# Patient Record
Sex: FEMALE | Race: WHITE | HISPANIC OR LATINO | Employment: UNEMPLOYED | URBAN - METROPOLITAN AREA
[De-identification: names, ages, dates, MRNs, and addresses within clinical notes are randomized per-mention and may not be internally consistent; named-entity substitution may affect disease eponyms.]

---

## 2023-06-14 ENCOUNTER — HOSPITAL ENCOUNTER (EMERGENCY)
Facility: HOSPITAL | Age: 48
Discharge: HOME | End: 2023-06-14
Attending: EMERGENCY MEDICINE

## 2023-06-14 VITALS
HEIGHT: 61 IN | SYSTOLIC BLOOD PRESSURE: 181 MMHG | HEART RATE: 82 BPM | TEMPERATURE: 99 F | BODY MASS INDEX: 29.27 KG/M2 | DIASTOLIC BLOOD PRESSURE: 113 MMHG | RESPIRATION RATE: 18 BRPM | OXYGEN SATURATION: 96 % | WEIGHT: 155 LBS

## 2023-06-14 DIAGNOSIS — M79.10 MYALGIA: Primary | ICD-10-CM

## 2023-06-14 LAB
ALBUMIN SERPL BCP-MCNC: 2.9 G/DL (ref 3.5–5.2)
ALP SERPL-CCNC: 101 U/L (ref 55–135)
ALT SERPL W/O P-5'-P-CCNC: 15 U/L (ref 10–44)
ANION GAP SERPL CALC-SCNC: 12 MMOL/L (ref 8–16)
AST SERPL-CCNC: 14 U/L (ref 10–40)
B-HCG UR QL: NEGATIVE
BASOPHILS # BLD AUTO: 0.04 K/UL (ref 0–0.2)
BASOPHILS NFR BLD: 0.4 % (ref 0–1.9)
BILIRUB SERPL-MCNC: 0.3 MG/DL (ref 0.1–1)
BUN SERPL-MCNC: 7 MG/DL (ref 6–20)
CALCIUM SERPL-MCNC: 9.4 MG/DL (ref 8.7–10.5)
CHLORIDE SERPL-SCNC: 105 MMOL/L (ref 95–110)
CK SERPL-CCNC: 19 U/L (ref 20–180)
CO2 SERPL-SCNC: 23 MMOL/L (ref 23–29)
CREAT SERPL-MCNC: 0.7 MG/DL (ref 0.5–1.4)
CTP QC/QA: YES
DIFFERENTIAL METHOD: ABNORMAL
EOSINOPHIL # BLD AUTO: 0.1 K/UL (ref 0–0.5)
EOSINOPHIL NFR BLD: 0.9 % (ref 0–8)
ERYTHROCYTE [DISTWIDTH] IN BLOOD BY AUTOMATED COUNT: 15.5 % (ref 11.5–14.5)
EST. GFR  (NO RACE VARIABLE): >60 ML/MIN/1.73 M^2
GLUCOSE SERPL-MCNC: 102 MG/DL (ref 70–110)
HCT VFR BLD AUTO: 34 % (ref 37–48.5)
HCV AB SERPL QL IA: NORMAL
HGB BLD-MCNC: 10.3 G/DL (ref 12–16)
HIV 1+2 AB+HIV1 P24 AG SERPL QL IA: NORMAL
IMM GRANULOCYTES # BLD AUTO: 0.19 K/UL (ref 0–0.04)
IMM GRANULOCYTES NFR BLD AUTO: 2 % (ref 0–0.5)
LYMPHOCYTES # BLD AUTO: 1.9 K/UL (ref 1–4.8)
LYMPHOCYTES NFR BLD: 19.1 % (ref 18–48)
MCH RBC QN AUTO: 25.7 PG (ref 27–31)
MCHC RBC AUTO-ENTMCNC: 30.3 G/DL (ref 32–36)
MCV RBC AUTO: 85 FL (ref 82–98)
MONOCYTES # BLD AUTO: 0.7 K/UL (ref 0.3–1)
MONOCYTES NFR BLD: 6.9 % (ref 4–15)
NEUTROPHILS # BLD AUTO: 6.9 K/UL (ref 1.8–7.7)
NEUTROPHILS NFR BLD: 70.7 % (ref 38–73)
NRBC BLD-RTO: 0 /100 WBC
PLATELET # BLD AUTO: 394 K/UL (ref 150–450)
PMV BLD AUTO: 10.3 FL (ref 9.2–12.9)
POTASSIUM SERPL-SCNC: 3.3 MMOL/L (ref 3.5–5.1)
PROT SERPL-MCNC: 7.1 G/DL (ref 6–8.4)
RBC # BLD AUTO: 4.01 M/UL (ref 4–5.4)
SODIUM SERPL-SCNC: 140 MMOL/L (ref 136–145)
WBC # BLD AUTO: 9.73 K/UL (ref 3.9–12.7)

## 2023-06-14 PROCEDURE — 96375 TX/PRO/DX INJ NEW DRUG ADDON: CPT

## 2023-06-14 PROCEDURE — 85025 COMPLETE CBC W/AUTO DIFF WBC: CPT | Performed by: EMERGENCY MEDICINE

## 2023-06-14 PROCEDURE — 82550 ASSAY OF CK (CPK): CPT | Performed by: EMERGENCY MEDICINE

## 2023-06-14 PROCEDURE — 63600175 PHARM REV CODE 636 W HCPCS: Performed by: EMERGENCY MEDICINE

## 2023-06-14 PROCEDURE — 87389 HIV-1 AG W/HIV-1&-2 AB AG IA: CPT | Performed by: EMERGENCY MEDICINE

## 2023-06-14 PROCEDURE — 96374 THER/PROPH/DIAG INJ IV PUSH: CPT

## 2023-06-14 PROCEDURE — 81025 URINE PREGNANCY TEST: CPT | Performed by: EMERGENCY MEDICINE

## 2023-06-14 PROCEDURE — 99284 EMERGENCY DEPT VISIT MOD MDM: CPT | Mod: ,,, | Performed by: EMERGENCY MEDICINE

## 2023-06-14 PROCEDURE — 99284 EMERGENCY DEPT VISIT MOD MDM: CPT | Mod: 25

## 2023-06-14 PROCEDURE — 80053 COMPREHEN METABOLIC PANEL: CPT | Performed by: EMERGENCY MEDICINE

## 2023-06-14 PROCEDURE — 25000003 PHARM REV CODE 250: Performed by: EMERGENCY MEDICINE

## 2023-06-14 PROCEDURE — 86803 HEPATITIS C AB TEST: CPT | Performed by: EMERGENCY MEDICINE

## 2023-06-14 PROCEDURE — 99284 PR EMERGENCY DEPT VISIT,LEVEL IV: ICD-10-PCS | Mod: ,,, | Performed by: EMERGENCY MEDICINE

## 2023-06-14 RX ORDER — METHYLPREDNISOLONE 4 MG/1
TABLET ORAL
Qty: 21 EACH | Refills: 0 | Status: SHIPPED | OUTPATIENT
Start: 2023-06-14 | End: 2023-07-05

## 2023-06-14 RX ORDER — DEXTROMETHORPHAN HYDROBROMIDE, GUAIFENESIN 5; 100 MG/5ML; MG/5ML
650 LIQUID ORAL EVERY 8 HOURS
COMMUNITY

## 2023-06-14 RX ORDER — DEXAMETHASONE SODIUM PHOSPHATE 4 MG/ML
8 INJECTION, SOLUTION INTRA-ARTICULAR; INTRALESIONAL; INTRAMUSCULAR; INTRAVENOUS; SOFT TISSUE
Status: COMPLETED | OUTPATIENT
Start: 2023-06-14 | End: 2023-06-14

## 2023-06-14 RX ORDER — KETOROLAC TROMETHAMINE 30 MG/ML
10 INJECTION, SOLUTION INTRAMUSCULAR; INTRAVENOUS
Status: COMPLETED | OUTPATIENT
Start: 2023-06-14 | End: 2023-06-14

## 2023-06-14 RX ADMIN — POTASSIUM BICARBONATE 40 MEQ: 391 TABLET, EFFERVESCENT ORAL at 08:06

## 2023-06-14 RX ADMIN — KETOROLAC TROMETHAMINE 10 MG: 30 INJECTION, SOLUTION INTRAMUSCULAR; INTRAVENOUS at 07:06

## 2023-06-14 RX ADMIN — DEXAMETHASONE SODIUM PHOSPHATE 8 MG: 4 INJECTION INTRA-ARTICULAR; INTRALESIONAL; INTRAMUSCULAR; INTRAVENOUS; SOFT TISSUE at 07:06

## 2023-06-14 NOTE — ED NOTES
Per josafat, patient states bone pain x 6 years, Tylenol and Naproxyn not working for her, Tylenol 3 hours PTA

## 2023-06-14 NOTE — ED NOTES
Patient identifiers verified and correct for  Ms Knight  C/C:  Bone pain SEE NN  APPEARANCE: awake and alert in NAD. PAIN  10/10  SKIN: warm, dry and intact. No breakdown or bruising.  MUSCULOSKELETAL: Patient moving all extremities spontaneously, no obvious swelling or deformities noted. Ambulates independently.  RESPIRATORY: Denies shortness of breath.Respirations unlabored.   CARDIAC: Denies CP, 2+ distal pulses; no peripheral edema  ABDOMEN: S/ND/NT, Denies nausea  : voids spontaneously, denies difficulty  Neurologic: AAO x 4; follows commands equal strength in all extremities; denies numbness/tingling. Denies dizziness  Denies all over weakness,

## 2023-06-14 NOTE — ED PROVIDER NOTES
"Encounter Date: 6/14/2023       History     Chief Complaint   Patient presents with    Generalized Body Aches     "All over pain", new rash to right forearm, "pain all through my bones"      HPI  47-year-old female, Irish speaking,  [id number] used, coming in with 6 months of diffuse body pain throughout her arms, legs, back.  She denies chest pain or shortness of breath at this time.  She states she is been seen at outside hospital given ibuprofen, Tylenol, gabapentin but is not helping her pain.  She has taken Tylenol today but no other pain medications.  She has had some positive rheumatoid factors and was told to follow-up with rheumatology.  She had an appointment but missed it.  Her next appointment is for August.    She denies current fevers, nausea vomiting diarrhea.  She does say that 3 weeks ago she had an episode of fevers diarrhea nausea vomiting which worsened her symptoms.  Those symptoms are currently resolved.  denies headache, denies recent steroid use.  She says she has used steroids and patch which helped her symptoms.    She notes a new rash on her right arm or last several days unclear etiology.  She says she saw her primary doctor and they did not explain what it was.    Review of patient's allergies indicates:  No Known Allergies  Past Medical History:   Diagnosis Date    HTN (hypertension)     Type 2 diabetes mellitus without complications      History reviewed. No pertinent surgical history.  History reviewed. No pertinent family history.  Social History     Tobacco Use    Smoking status: Never    Smokeless tobacco: Never   Substance Use Topics    Alcohol use: No    Drug use: No     Review of Systems    Physical Exam     Initial Vitals [06/14/23 1639]   BP Pulse Resp Temp SpO2   (!) 186/84 91 20 98.8 °F (37.1 °C) 100 %      MAP       --         Physical Exam    Physical Exam:  CONSTITUTIONAL: Well developed, well nourished, in no acute distress.  Appears older than stated " age.  HENT: Normocephalic, atraumatic    EYES: Sclerae anicteric   NECK: Supple, no thyroid enlargement  CARDIOVASCULAR: Regular rate and rhythm without any murmurs, gallops, rubs.  RESPIRATORY: Speaking in full sentences. Breathing comfortably. Auscultation of the lungs revealed normal breath sounds b/l, no wheezing, no rales, no rhonchi.  ABDOMEN: Soft and nontender, no masses, no rebound or guarding   NEUROLOGIC: Alert, interacting normally. No facial droop.   MSK: Moving all four extremities.  Skin:  Scattered purpura in her right arm, , nonblanching, nontender.  Also bruising at site of old IV sites.  Warm and dry. No visible rash on exposed areas of skin.    Psych:  Uncomfortable appearing.           ED Course   Procedures  Labs Reviewed   CBC W/ AUTO DIFFERENTIAL - Abnormal; Notable for the following components:       Result Value    Hemoglobin 10.3 (*)     Hematocrit 34.0 (*)     MCH 25.7 (*)     MCHC 30.3 (*)     RDW 15.5 (*)     Immature Granulocytes 2.0 (*)     Immature Grans (Abs) 0.19 (*)     All other components within normal limits    Narrative:     Release to patient->Immediate   COMPREHENSIVE METABOLIC PANEL - Abnormal; Notable for the following components:    Potassium 3.3 (*)     Albumin 2.9 (*)     All other components within normal limits    Narrative:     Release to patient->Immediate   CK - Abnormal; Notable for the following components:    CPK 19 (*)     All other components within normal limits    Narrative:     Release to patient->Immediate   HIV 1 / 2 ANTIBODY    Narrative:     Release to patient->Immediate   HEPATITIS C ANTIBODY    Narrative:     Release to patient->Immediate   POCT URINE PREGNANCY          Imaging Results    None          Medications   ketorolac injection 9.999 mg (9.999 mg Intravenous Given 6/14/23 1950)   dexAMETHasone injection 8 mg (8 mg Intravenous Given 6/14/23 1949)   potassium bicarbonate disintegrating tablet 40 mEq (40 mEq Oral Given 6/14/23 2041)     Medical  Decision Making:   History:   Old Medical Records: I decided to obtain old medical records.  Old Records Summarized: records from previous admission(s).       <> Summary of Records: Note from [facility] from 06/07:  48 yo F presenting with various complaints most concerning for bilateral upper extremity pain, generalized fatigue, and nausea/vomiting. Hx of arthritis and elevated rheumatoid factor - currently being worked up for by rheumatology but missed her appointment in 3 weeks ago per chart review. Denies any associated chest pain or fever. On exam, pt hemodynamically stable. Appears older than stated age. No neurological deficits appreciated.    Following initial exam, DDx very broad including but not limited to endocrine pathology, acute flare of undx'd RA, electrolyte derangement. Low likelihood for sepsis, infectious process, or trauma. Low suspicion for intracranial process given absence of focal deficits on exam. Low suspicion for Multiple Sclerosis given chart review, extensive discussion of hx, and presenting complaints today. No signs of cervical radiculopathy on exam either.     Cardiac workup initiated in triage which was not concerning for an acute ischemic process including serial cardiac biomarkers. BNP without evidence of acute heart failure. Hypokalemia noted with borderline low hypomagnesemia. These were both supplemented. Signs of dehydration with dry MM on exam which improved following 1L LR IVF. Able to tolerate PO following IV antiemetics x1. Pt's TSH marked increase from TSH 7 days ago but still within nml limits, however, FT4 not concerning for acute hypothyroidism. Concern for this being related to RA or other rheumatologic process. Also concerned for additional underlying process contributing to her sxs. However, no additional workup from an in-pt or emergent standpoint warranted at this point in time. Discussed this at length with pt and encouraged close follow-up with her PCP and  rheumatology. Also extensively reviewed red flags/reasons to return to the ED within the appropriate timeframe including worsening pain, acute weakness, worsened neck pain, neck stiffness, headache, fever, inability to tolerate PO, or any other acute concerns.     Clinical Tests:   Lab Tests: Ordered and Reviewed     47-year-old female coming in with 6 years of body pain.  Seems to have worsening recently.  Her heart, lung, abdominal exam is benign.  She does have some purple around her right arm.    Of note she was seen 1 week ago at [facility] where labs were undertaken which were benign.  PT PTT were benign.  She was discharged with outpatient follow-up.    I strongly suspect a rheumatological / vasculitis?  etiology for her symptoms given the duration of symptoms as well as generalized symptoms as well as lack of findings on ED workups.    No signs or symptoms of active infection.    Will undertake repeat labs including CBC, CMP, CPK to look for any new metabolic hematologic abnormalities specially given her purpuric rash on her right arm.  Will start treatment with Toradol and dexamethasone.    If ED workup is benign will likely discharge on Medrol Dosepak for control of her symptoms pending her appointment with rheumatology.      Update:  No signs of coagulopathy on workup.  Creatinine is normal.  CPK is not elevated.  Patient feels significantly improved after ED medications.  Will discharge home with Medrol Dosepak and recommendation to continue Tylenol or Motrin as well as follow-up with rheumatology.  ED return precautions.    Findings of ED work up and return precautions verbally explained to patient. Patient agrees with discharge plan, return instructions and verbalizes understanding of return precautions.                          Clinical Impression:   Final diagnoses:  [M79.10] Myalgia (Primary)        ED Disposition Condition    Discharge Stable          ED Prescriptions       Medication Sig Dispense Start  Date End Date Auth. Provider    methylPREDNISolone (MEDROL DOSEPACK) 4 mg tablet use as directed 21 each 6/14/2023 7/5/2023 Ant Blanc MD          Follow-up Information       Follow up With Specialties Details Why Contact Info    Primary care doctor  In 1 week      Rheumatology                 Ant Blanc MD  06/15/23 1319

## 2023-06-15 NOTE — DISCHARGE INSTRUCTIONS
Gleason examen físico y lorraine laboratorios no muestran signos de condiciones médicas inmediatamente peligrosas. Le dieron esteroides para la inflamación. Debe hacer un seguimiento con gleason médico de atención primaria dentro de 1 semana, así kyleigh con reumatología. Continúe tomando Tylenol y naproxeno según lo recetado anteriormente.      Si desarrolla algún síntoma preocupante nuevo que empeora, regrese al departamento de emergencias para mark reevaluación.    ------------------    Your physical exam and labs do not show any signs of immediately Dangerous medical conditions.You were given steroids,  for inflammation.  Should follow-up with her primary care doctor  Within 1 week as well as with rheumatology.  You continue take Tylenol and naproxen as previously prescribed.      If you develop any new, worsening worrisome symptoms return to the emergency department for re-evaluation.

## 2023-06-15 NOTE — ED TRIAGE NOTES
Pt. Is a 47 yr old  who presents to the ED with generalized pain, in her bones, all over her body for six years. For the past three weeks fever, n/v/d. Last ep of vomiting was three days ago. She went to [facility] x 3 days ago.  Last food she could keep down was yesterday.

## 2025-08-05 ENCOUNTER — PATIENT MESSAGE (OUTPATIENT)
Dept: RADIOLOGY | Facility: HOSPITAL | Age: 50
End: 2025-08-05
Payer: MEDICAID